# Patient Record
Sex: MALE | Race: WHITE
[De-identification: names, ages, dates, MRNs, and addresses within clinical notes are randomized per-mention and may not be internally consistent; named-entity substitution may affect disease eponyms.]

---

## 2018-03-17 NOTE — XMS REPORT
Continuity of Care Document

 Created on: 2018



SOPHIE CALLAHAN

External Reference #: [id number]

: 1975

Sex: Male



Demographics







 Address  [address] [place]  [address]

 

 Home Phone  [phone]

 

 Preferred Language  Unknown

 

 Marital Status  Unknown

 

 Sabianist Affiliation  Unknown

 

 Race  White

 

 Ethnic Group  Unknown





Author







 Author  [facility]

 

 Organization  [facility]

 

 Address  [address] [place]  [address]



 

 Phone  Unavailable







Support







 Name  Relationship  Address  Phone

 

 TANMAY ESPOSITO MD  Caregiver  [address] [place]  [address]  [phone]

 

 NO, FAMILY  Caregiver  Unknown  Unavailable

 

 NO, PCP  Caregiver  Unknown  Unavailable

 

 ALLYSONANN-MARIENY  Next Of Kin  [address] [place], [place]  [id number] [phone]







Care Team Providers







 Care Team Member Name  Role  Phone

 

 NO, PCP  PCP  Unavailable







Insurance Providers







 Guarantor  Sophie Callahan

 

 Address  [address] [place] [address]

 

 Phone  [phone]

 

 Email  NA











 FilesX

 

 Policy Number  [id number]

 

 Subscriber's Name  Sophie Callahan

 

 Relationship  18 Self / Same As Patient







Advance Directives







 Directive  Response  Recorded Date/Time

 

 Does the patient have an advance directive?  No  18 7:56am

 

 Do you have a Directive to Physician?  No  18 7:56am

 

 Do you have a Medical Power of ?  No  18 7:56am

 

 Do you have an out of hospital Do Not Resuscitate Order?  No  18 7:56am

 

 Do you have any special needs we should be aware of?  No  18 7:56am

 

 Do you have a support person here with you today?  Yes  18 7:56am

 

 Did patient receive Notice of Privacy Practices?  Yes  18 7:56am

 

 Did patient receive patient rights and responsibilities?  Yes  18 7:56am







Problems

No problem information available.



Medications





Current Home Medications





 Medication  Dose  Units  Route  Directions  Days  Qty  Instructions  Start Date

 

 Alprazolam (Xanax) 0.5 Mg Tablet           Daily            

 

 Amphet Asp/Amphet/D-Amphet (Adderall 10 Mg Tablet) 10 Mg Tablet               
         

 

 Varenicline Tartrate (Chantix) 1 Mg Tablet                        







Social History







 Smoking Status  Start Date  Stop Date

 

 Former smoker      







Hospital Discharge Instructions

No hospital discharge instruction information available.



Plan of Care







 Discharge Date  18 8:40am

 

 Disposition  HOME, SELF-CARE

 

 Condition at Discharge  Stable

 

 Instructions/Education Provided  Cellulitis

 

 Forms Provided  Work/School Excuse

 

 Prescriptions  See Medication Section

 

 Additional Instructions/Education  **Follow-up immediately, if symptoms worsen
, and if the redness 

or pain is increasing, or if you develop fever or chills. **







**Keep the foot elevated as much as possible, for the next 4 -48 

hours.**







**Complete ALL antibiotics.**







**In addition to the prescription pain medication, recommend: 

Ibuprofen 200 mg - 3 tabs together every 8 hours, with food, for 

the next 2-3 days, to help control the pain.**







Functional Status

No functional status information available.



Allergies, Adverse Reactions, Alerts

No known allergies.



Immunizations

No immunization information available.



Vital Signs





Acute Vital Signs





 Vital  Response  Date/Time

 

 Pulse      

 

    Pulse Rate (adult)  76 bpm (60 - 90)  2018 10:01am

 

 Respiratory Rate  16 bpm (12 - 24)  2018 10:01am

 

 Blood Pressure  124/77 mm Hg  2018 10:01am

 

 Height  6 ft 0 in  2018 7:00am

 

 Weight  195 lb  2018 7:00am

 

 Body Mass Index  26.4 kg/m^2  2018 7:00am







Results

No relevant diagnostic test, laboratory data and/or discharge summary 
information available.



Procedures

No procedure information available.



Encounters







 Encounter  Location  Arrival/Admit Date  Discharge/Depart Date  Attending 
Provider

 

 Departed Emergency Room  [facility]  18 6:55am   8:40am  TANMAY ESPOSITO MD

## 2018-12-02 ENCOUNTER — HOSPITAL ENCOUNTER (EMERGENCY)
Dept: HOSPITAL 88 - FSED | Age: 43
Discharge: HOME | End: 2018-12-02
Payer: COMMERCIAL

## 2018-12-02 VITALS — WEIGHT: 210 LBS | BODY MASS INDEX: 28.44 KG/M2 | HEIGHT: 72 IN

## 2018-12-02 VITALS — DIASTOLIC BLOOD PRESSURE: 82 MMHG | SYSTOLIC BLOOD PRESSURE: 130 MMHG
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
## 2018-12-02 DIAGNOSIS — M54.5: ICD-10-CM

## 2018-12-02 DIAGNOSIS — S70.11XA: ICD-10-CM

## 2018-12-02 DIAGNOSIS — S80.01XA: ICD-10-CM

## 2018-12-02 DIAGNOSIS — G89.11: Primary | ICD-10-CM
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
## 2018-12-02 PROCEDURE — 73562 X-RAY EXAM OF KNEE 3: CPT

## 2018-12-02 PROCEDURE — 72192 CT PELVIS W/O DYE: CPT

## 2018-12-02 PROCEDURE — 73552 X-RAY EXAM OF FEMUR 2/>: CPT

## 2018-12-02 PROCEDURE — 99284 EMERGENCY DEPT VISIT MOD MDM: CPT

## 2018-12-02 PROCEDURE — 72131 CT LUMBAR SPINE W/O DYE: CPT

## 2018-12-02 NOTE — DIAGNOSTIC IMAGING REPORT
Examination: CT LUMBAR SPINE WITHOUT CONTRAST



History: Low back pain after fall. 

Comparison studies: None



Technique: 

Axial images were obtained through the lumbar spine from T12.

Coronal and sagittal reconstructions obtained from the axial data.

Dose modulation, iterative reconstruction, and/or weight based adjustment of

the mA/kV was utilized to reduce the radiation dose to as low as reasonably

achievable. 

Intravenous contrast: None



Findings:



The usual 5 non-rib bearing lumbar vertebral bodies are present.

Alignment: Normal lordosis.  No scoliosis.

Soft tissues: No abnormalities.

Paraspinal muscles: Unremarkable.

Sacroiliac joints: No degenerative changes.



Vertebrae:  

No fractures, infection or neoplasm.



Degenerative changes:



L1-L2 through L5-S1:

No abnormalities.



IMPRESSION:



No acute abnormality.





Signed by: Dr. Josie Jordan M.D. on 12/2/2018 12:46 PM

## 2018-12-02 NOTE — DIAGNOSTIC IMAGING REPORT
CT scan of the pelvis WITHOUT intravenous contrast.



TECHNIQUE:

Standard departmental protocols were used. 

Sagittal and coronal reformatted images were obtained. 

Total exam DLP: 302.69



HISTORY:  Pain status post fall from ladder.



COMPARISON:  None.



FINDINGS:

Bones:  No acute displaced fracture. Well corticated ossicle is present just

lateral to the anterior lateral to the hip bilaterally which may reflect

osteophytes or less likely prior avulsion injury.



Joints:  

Joint spaces are preserved.



Soft tissues:  

No acute abnormality.





IMPRESSION: 

No acute osseous abnormality.



Signed by: Dr. ANGIE Doyle M.D. on 12/2/2018 12:39 PM

## 2018-12-02 NOTE — DIAGNOSTIC IMAGING REPORT
Right knee 3 views. Right femur 7 views



HISTORY:  Pain



COMPARISON: None

     

FINDINGS:

No displaced fracture.  

Osseous alignment is within normal limits.

The joint spaces are well-maintained.

The soft tissues appear unremarkable.





IMPRESSION: 

No acute radiographic abnormality.



Signed by: Dr. ANGIE Doyle M.D. on 12/2/2018 1:17 PM

## 2019-02-16 ENCOUNTER — HOSPITAL ENCOUNTER (EMERGENCY)
Dept: HOSPITAL 88 - ER | Age: 44
Discharge: HOME | End: 2019-02-16
Payer: COMMERCIAL

## 2019-02-16 VITALS — SYSTOLIC BLOOD PRESSURE: 109 MMHG | DIASTOLIC BLOOD PRESSURE: 82 MMHG

## 2019-02-16 VITALS — BODY MASS INDEX: 28.44 KG/M2 | HEIGHT: 72 IN | WEIGHT: 210 LBS

## 2019-02-16 VITALS — DIASTOLIC BLOOD PRESSURE: 95 MMHG | SYSTOLIC BLOOD PRESSURE: 116 MMHG

## 2019-02-16 VITALS — WEIGHT: 210 LBS | HEIGHT: 72 IN | BODY MASS INDEX: 28.44 KG/M2

## 2019-02-16 DIAGNOSIS — F41.9: ICD-10-CM
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
## 2019-02-16 DIAGNOSIS — S80.11XA: ICD-10-CM
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
## 2019-02-16 DIAGNOSIS — S80.11XA: Primary | ICD-10-CM

## 2019-02-16 DIAGNOSIS — M79.661: Primary | ICD-10-CM

## 2019-02-16 LAB
ALBUMIN SERPL-MCNC: 3.9 G/DL (ref 3.5–5)
ALBUMIN/GLOB SERPL: 1.6 {RATIO} (ref 0.8–2)
ALP SERPL-CCNC: 74 IU/L (ref 40–150)
ALT SERPL-CCNC: 19 IU/L (ref 0–55)
ANION GAP SERPL CALC-SCNC: 14.8 MMOL/L (ref 8–16)
BACTERIA URNS QL MICRO: (no result) /HPF
BASOPHILS # BLD AUTO: 0 10*3/UL (ref 0–0.1)
BASOPHILS NFR BLD AUTO: 0.3 % (ref 0–1)
BILIRUB UR QL: NEGATIVE
BNP BLD-MCNC: 13 PG/ML (ref 0–100)
BUN SERPL-MCNC: 14 MG/DL (ref 7–26)
BUN/CREAT SERPL: 15 (ref 6–25)
CALCIUM SERPL-MCNC: 8.8 MG/DL (ref 8.4–10.2)
CHLORIDE SERPL-SCNC: 104 MMOL/L (ref 98–107)
CK MB SERPL-MCNC: 0.9 NG/ML (ref 0–5)
CK SERPL-CCNC: 160 IU/L (ref 30–200)
CLARITY UR: CLEAR
CO2 SERPL-SCNC: 24 MMOL/L (ref 22–29)
COLOR UR: YELLOW
DEPRECATED APTT PLAS QN: 27.4 SECONDS (ref 23.8–35.5)
DEPRECATED INR PLAS: 0.9
DEPRECATED NEUTROPHILS # BLD AUTO: 7.4 10*3/UL (ref 2.1–6.9)
DEPRECATED RBC URNS MANUAL-ACNC: (no result) /HPF (ref 0–5)
EGFRCR SERPLBLD CKD-EPI 2021: > 60 ML/MIN (ref 60–?)
EOSINOPHIL # BLD AUTO: 0.1 10*3/UL (ref 0–0.4)
EOSINOPHIL NFR BLD AUTO: 0.5 % (ref 0–6)
EPI CELLS URNS QL MICRO: (no result) /LPF
ERYTHROCYTE [DISTWIDTH] IN CORD BLOOD: 12.2 % (ref 11.7–14.4)
GLOBULIN PLAS-MCNC: 2.4 G/DL (ref 2.3–3.5)
GLUCOSE SERPLBLD-MCNC: 75 MG/DL (ref 74–118)
HCT VFR BLD AUTO: 38.7 % (ref 38.2–49.6)
HGB BLD-MCNC: 13.7 G/DL (ref 14–18)
KETONES UR QL STRIP.AUTO: NEGATIVE
LEUKOCYTE ESTERASE UR QL STRIP.AUTO: NEGATIVE
LYMPHOCYTES # BLD: 1.2 10*3/UL (ref 1–3.2)
LYMPHOCYTES NFR BLD AUTO: 12.2 % (ref 18–39.1)
MCH RBC QN AUTO: 33.3 PG (ref 28–32)
MCHC RBC AUTO-ENTMCNC: 35.4 G/DL (ref 31–35)
MCV RBC AUTO: 94.2 FL (ref 81–99)
MONOCYTES # BLD AUTO: 1.2 10*3/UL (ref 0.2–0.8)
MONOCYTES NFR BLD AUTO: 12 % (ref 4.4–11.3)
NEUTS SEG NFR BLD AUTO: 74.7 % (ref 38.7–80)
NITRITE UR QL STRIP.AUTO: NEGATIVE
PLATELET # BLD AUTO: 253 X10E3/UL (ref 140–360)
POTASSIUM SERPL-SCNC: 3.8 MMOL/L (ref 3.5–5.1)
PROT UR QL STRIP.AUTO: NEGATIVE
PROTHROMBIN TIME: 13 SECONDS (ref 11.9–14.5)
RBC # BLD AUTO: 4.11 X10E6/UL (ref 4.3–5.7)
SODIUM SERPL-SCNC: 139 MMOL/L (ref 136–145)
SP GR UR STRIP: 1.03 (ref 1.01–1.02)
UROBILINOGEN UR STRIP-MCNC: 0.2 MG/DL (ref 0.2–1)
WBC #/AREA URNS HPF: (no result) /HPF (ref 0–5)

## 2019-02-16 PROCEDURE — 85610 PROTHROMBIN TIME: CPT

## 2019-02-16 PROCEDURE — 85730 THROMBOPLASTIN TIME PARTIAL: CPT

## 2019-02-16 PROCEDURE — 87086 URINE CULTURE/COLONY COUNT: CPT

## 2019-02-16 PROCEDURE — 83605 ASSAY OF LACTIC ACID: CPT

## 2019-02-16 PROCEDURE — 83880 ASSAY OF NATRIURETIC PEPTIDE: CPT

## 2019-02-16 PROCEDURE — 85025 COMPLETE CBC W/AUTO DIFF WBC: CPT

## 2019-02-16 PROCEDURE — 36415 COLL VENOUS BLD VENIPUNCTURE: CPT

## 2019-02-16 PROCEDURE — 82553 CREATINE MB FRACTION: CPT

## 2019-02-16 PROCEDURE — 87040 BLOOD CULTURE FOR BACTERIA: CPT

## 2019-02-16 PROCEDURE — 81001 URINALYSIS AUTO W/SCOPE: CPT

## 2019-02-16 PROCEDURE — 82550 ASSAY OF CK (CPK): CPT

## 2019-02-16 PROCEDURE — 29530 STRAPPING OF KNEE: CPT

## 2019-02-16 PROCEDURE — 84484 ASSAY OF TROPONIN QUANT: CPT

## 2019-02-16 PROCEDURE — 99284 EMERGENCY DEPT VISIT MOD MDM: CPT

## 2019-02-16 PROCEDURE — 99283 EMERGENCY DEPT VISIT LOW MDM: CPT

## 2019-02-16 PROCEDURE — 80053 COMPREHEN METABOLIC PANEL: CPT

## 2019-02-16 PROCEDURE — 93971 EXTREMITY STUDY: CPT

## 2019-02-16 PROCEDURE — 73701 CT LOWER EXTREMITY W/DYE: CPT

## 2019-02-16 NOTE — DIAGNOSTIC IMAGING REPORT
EXAM: CT right lower extremity WITH contrast  

INDICATION: SWELLING OF CALF 

COMPARISON: Right knee radiographs 12/2/2018

TECHNIQUE: Right lower extremity was scanned utilizing a multidetector helical

scanner after administration of IV contrast. Coronal and sagittal reformations

were obtained. Routine protocol was performed. 

     IV CONTRAST: 100 mL of Isovue-370

            

COMPLICATIONS: None



RADIATION DOSE:

     Total DLP: 534.92 mGy*cm

          Dose modulation, iterative reconstruction, and/or weight based

adjustment of the mA/kV was utilized to reduce the radiation dose to as low as

reasonably achievable. 



FINDINGS:



BONES: Unremarkable.



SOFT TISSUES: Approximately 18 x 10.7 x 5.9 cm collection is seen in the medial

knee and proximal leg. Collection appears encapsulated, conforming to the

fascia, with areas of nodularity/thickening, for example a 2.2 cm area along on

the anterior aspect (axial soft tissue, series 3 image 222). The collection

appears new within the limitations of x-ray comparison when comparing current

 radiographs to previous knee radiographs performed on 12/2/2018.



IMPRESSION: 

Encapsulated 18 cm collection in the medial aspect of the right knee and

proximal calf. In the setting of trauma, findings may suggest Pineda-Lavall?e

lesion. Areas of nodularity are noted which can also be seen with

Pineda-Lavall?e lesions, although underlying lesion difficult to exclude. If

surgery not considered, recommend follow-up ultrasound to document resolution.



Signed by: DR. Alton East MD on 2/16/2019 9:41 PM

## 2019-02-16 NOTE — XMS REPORT
Patient Summary Document

                             Created on: 2019



SOPHIE VALADEZ

External Reference #: [id number]

: 1975

Sex: Male



Demographics







                          Address                   [address] [place] [address]  [address]

 

                          Home Phone                [phone]

 

                          Preferred Language        Unknown

 

                          Marital Status            Unknown

 

                          Mormonism Affiliation     Unknown

 

                          Race                      Unknown

 

                          Ethnic Group              Unknown





Author







                          Author                    [facility]

 

                          [facility]

 

                          Address                   Unknown

 

                          Phone                     Unavailable







Care Team Providers







                    Care Team Member Name    Role                Phone

 

                    MAU VOGT     Unavailable         Unavailable







Problems

This patient has no known problems.



Allergies, Adverse Reactions, Alerts

This patient has no known allergies or adverse reactions.



Medications

This patient has no known medications.



Results







           Test Description    Test Time    Test Comments    Text Results    Atomic Results    Result

 Comments

 

                KNEE 3VW RT - HOPD    2018 13:13:00                                                        

                                                  [place] [address]      Patient Name: SOPHIE VALADEZ                           
       MR #: [id number]                     : 1975                     
             Age/Sex: 43/M  Acct #: [id number]                              
Req #: [id number]  [facility] Physician:                                               
      Ordered by: HUBER VOGT MD                            Report #: [id number]
[id number]        Location: [facility]                                    Room/Bed:         
           
___________________________________________________________________________________________________
   Procedure: [id number] HOPD/KNEE 3VW RT - HOPD  Exam Date: 18            
               Exam Time: 1256                                              
REPORT STATUS: Signed    Right knee 3 views. Right femur 7 views      HISTORY:  
Pain      COMPARISON: None           FINDINGS:   No displaced fracture.     
Osseous alignment is within normal limits.   The joint spaces are well-
maintained.   The soft tissues appear unremarkable.         IMPRESSION:    No 
acute radiographic abnormality.      Signed by: Dr. ANGIE Rodriguez M.D. 
on 2018 1:17 PM        Dictated By: SOURAV RODRIGUEZ MD, MD  Electronically 
Signed By: SOURAV RODRIGUEZ MD, MD on 18  Transcribed By: SVETLANA on 
18       COPY TO:   HUBER VOGT MD             

 

                FEMUR 2 VIEW RT - HOPD    2018 13:13:00                                                    

                                                      [place] [address]      Patient Name: SOPHIE VALADEZ                           
       MR #: [id number]                     : 1975                     
             Age/Sex: 43/M  Acct #: [id number]                              
Req #: [id number]  Adm Physician:                                               
      Ordered by: HUBER VOGT MD                            Report #: [id number]
[id number]        Location: [facility]                                    Room/Bed:         
           
___________________________________________________________________________________________________
   Procedure: [id number] HOPD/FEMUR 2 VIEW RT - HOPD  Exam Date: 18        
                   Exam Time: 1245                                              
REPORT STATUS: Signed    Right knee 3 views. Right femur 7 views      HISTORY:  
Pain      COMPARISON: None           FINDINGS:   No displaced fracture.     
Osseous alignment is within normal limits.   The joint spaces are well-
maintained.   The soft tissues appear unremarkable.         IMPRESSION:    No 
acute radiographic abnormality.      Signed by: Dr. ANGIE Rodriguez M.D. 
on 2018 1:17 PM        Dictated By: SOURAV RODRIGUEZ MD, MD  Electronically 
Signed By: SOURAV RODRIGUEZ MD, MD on 18  Transcribed By: SVETLANA on 
18       COPY TO:   HUBER VOGT MD             

 

                CT LUMBAR SPINE WITHOUT-HOPD    2018 12:41:00                                              

                                                            [place] [address]      Patient Name: SOPHIE VALADEZ                 
                 MR #: [id number]                     : 1975           
                       Age/Sex: 43/M  Acct #: [id number]                      
       Req #: [id number]  Adm Physician:                                        
             Ordered by: HUBER VOGT MD                            Report #:
[id number]        Location: [facility]                                    Room/Bed:    
                
___________________________________________________________________________________________________
   Procedure: [id number] HOPD/CT LUMBAR SPINE WITHOUT-HOPD  Exam Date: 18  
                         Exam Time: 1228                                        
     REPORT STATUS: Signed    Examination: CT LUMBAR SPINE WITHOUT CONTRAST     
History: Low back pain after fall.    Comparison studies: None      Technique:  
 Axial images were obtained through the lumbar spine from T12.   Coronal and 
sagittal reconstructions obtained from the axial data.   Dose modulation, 
iterative reconstruction, and/or weight based adjustment of   the mA/kV was 
utilized to reduce the radiation dose to as low as reasonably   achievable.    I
ntravenous contrast: None      Findings:      The usual 5 non-rib bearing lumbar
vertebral bodies are present.   Alignment: Normal lordosis.  No scoliosis.   
Soft tissues: No abnormalities.   Paraspinal muscles: Unremarkable.   Sacroiliac
joints: No degenerative changes.      Vertebrae:     No fractures, infection or 
neoplasm.      Degenerative changes:      L1-L2 through L5-S1:   No 
abnormalities.      IMPRESSION:      No acute abnormality.         Signed by: 
Dr. Josie Jordan M.D. on 2018 12:46 PM        Dictated By: JOSIE BERNARD MD  Electronically Signed By: JOSIE BERNARD MD on 
18 1246  Transcribed By: SVETLANA on 18 1246       COPY TO:   
HUBER VOGT MD                        

 

                CT PELVIS WITHOUT-HOPD    2018 12:32:00                                                    

                                                      [place] [address]      Patient Name: SOPHIE VALADEZ                           
       MR #: [id number]                     : 1975                     
             Age/Sex: 43/M  Acct #: [id number]                              
Req #: [id number]  [facility] Physician:                                               
      Ordered by: HUBER VOGT MD                            Report #: [id number]
[id number]        Location: [facility]                                    Room/Bed:         
           
___________________________________________________________________________________________________
   Procedure: [id number] HOPD/CT PELVIS WITHOUT-HOPD  Exam Date: 18        
                   Exam Time: 1227                                              
REPORT STATUS: Signed       CT scan of the pelvis WITHOUT intravenous contrast. 
    TECHNIQUE:   Standard departmental protocols were used.    Sagittal and 
coronal reformatted images were obtained.    Total exam DLP: 302.69      
HISTORY:  Pain status post fall from ladder.      COMPARISON:  None.      
FINDINGS:   Bones:  No acute displaced fracture. Well corticated ossicle is 
present just   lateral to the anterior lateral to the hip bilaterally which may 
reflect   osteophytes or less likely prior avulsion injury.      Joints:     
Joint spaces are preserved.      Soft tissues:     No acute abnormality.        
IMPRESSION:    No acute osseous abnormality.      Signed by: Dr. ANGIE Rodriguez M.D. on 2018 12:39 PM        Dictated By: SOURAV ORDRIGUEZ MD, MD  
Electronically Signed By: SOURAV RODRIGUEZ MD, MD on 18 1239  Transcribed By:
SVETLANA on 18 1239       COPY TO:   HUBER VOGT MD

## 2019-02-19 ENCOUNTER — HOSPITAL ENCOUNTER (OUTPATIENT)
Dept: HOSPITAL 88 - OR | Age: 44
Discharge: HOME | End: 2019-02-19
Attending: SURGERY
Payer: COMMERCIAL

## 2019-02-19 VITALS — SYSTOLIC BLOOD PRESSURE: 120 MMHG | DIASTOLIC BLOOD PRESSURE: 64 MMHG
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
## 2019-02-19 DIAGNOSIS — S80.11XA: Primary | ICD-10-CM

## 2019-02-19 DIAGNOSIS — Z87.891: ICD-10-CM

## 2019-02-19 PROCEDURE — 87205 SMEAR GRAM STAIN: CPT

## 2019-02-19 PROCEDURE — 87075 CULTR BACTERIA EXCEPT BLOOD: CPT

## 2019-02-19 PROCEDURE — 87071 CULTURE AEROBIC QUANT OTHER: CPT

## 2019-02-19 PROCEDURE — 93005 ELECTROCARDIOGRAM TRACING: CPT

## 2019-02-19 PROCEDURE — 10140 I&D HMTMA SEROMA/FLUID COLLJ: CPT

## 2019-02-19 NOTE — OPERATIVE REPORT
DATE OF PROCEDURE:  02/19/2019

 

PREOPERATIVE DIAGNOSIS:  Traumatic fluid collection, right leg, possible liquefied

hematoma. 

 

POSTOPERATIVE DIAGNOSIS:  Liquefied hematoma.

 

PROCEDURE PERFORMED:  Incision and drainage of hematoma of the right leg over the calf

region. 

 

ANESTHESIA:  General.

 

ESTIMATED BLOOD LOSS:  Minimal.

 

DRAINS:  None.

 

COMPLICATIONS:  None.

 

INDICATION AND FINDINGS:  The patient is 43-year-old male, who fell on December 6th at

home from a ladder while attempting to hang some [place] lights.  The patient was

evaluated at the emergency room.  They did x-rays at that time and no fracture was

found.  The patient was sent to follow up with Orthopedics.  Apparently, he did not, but

then 2 or 3 days ago, he began to notice more pain and swelling over the right calf

area.  He came back to the emergency room, where a CT scan revealed a fluid collection

in the area of the calf medially and posteriorly.  There was no evidence of bony injury.

 The patient was initially referred to Orthopedics, but Orthopedics advised referral to

general surgeon.  I was on-call for the emergency room, so I accepted the patient.  This

happened on Sunday, December 17th.  I saw the patient in my office on Monday, December 18th and he was complaining of severe pain to the right calf region, not alleviated by

Tylenol No. 3.  He requested Lortab, which was given to him.  Of note is the fact that

while in the emergency room on 02/17/2019, underwent venous Doppler of the lower

extremity and deep clot was ruled out. 

 

INTRAOPERATIVE FINDINGS:  The patient had a liquefied hematoma, non-infected, about 500

mL were easily aspirated.  A 10 mm flat Ar Goodson drain was placed to drain the

entire cavity, which was rather large, extended from the area of the popliteal region

inferiorly to the lower leg inferiorly occupying the posterior and middle part of the

calf region. 

 

DESCRIPTION OF PROCEDURE:  With patient lying on the operation table in the supine

position, after administration of general anesthesia, he was prepped and draped for

incision and drainage of fluid collection of the right calf region.  An incision was

made over the most fluctuant area, and the cavity was entered, large amounts of fluid

were extracted, liquefied hematoma, did not appear to be infected, about 500 mL in total

were aspirated or drained, and then we irrigated the cavity, then we placed a 10 mm flat

Ar Goodson drain through corner incision inferior to the previously placed and

brought it up to the upper calf popliteal region and exited through this lower incision

and secured there with 2-0 nylon.  The upper small incision was closed using 3-0 nylon.

The drain was connected to self suction.  We went ahead and then 

proceeded to put a pressure dressing with Kerlix, Ace wraps.  The drain was connected to

self suction.  The patient tolerated the procedure well, taken to recovery room in

stable condition. 

 

 

 

 

______________________________

MD IONA Alfred/DENISA

D:  02/19/2019 14:15:14

T:  02/19/2019 22:49:49

Job #:  [id number]